# Patient Record
(demographics unavailable — no encounter records)

---

## 2024-10-29 NOTE — REASON FOR VISIT
[de-identified] : Lumbar decompression and fusion L3-S1  [de-identified] : 08/07/24 [de-identified] : 12 [de-identified] : Pt is here for his 12 week post op visit.

## 2024-10-29 NOTE — PHYSICAL EXAM
[General Appearance - Alert] : alert [General Appearance - In No Acute Distress] : in no acute distress [General Appearance - Well Nourished] : well nourished [General Appearance - Well Developed] : well developed [General Appearance - Well-Appearing] : healthy appearing [] : normal voice and communication [Clean] : clean [Dry] : dry [Healing Well] : healing well [No Drainage] : without drainage [FreeTextEntry1] : Lumbar spine [FreeTextEntry6] : No evidence of wound dehiscence. [Oriented To Time, Place, And Person] : oriented to person, place, and time [Impaired Insight] : insight and judgment were intact [Affect] : the affect was normal [Mood] : the mood was normal [Memory Recent] : recent memory was not impaired [Memory Remote] : remote memory was not impaired [Person] : oriented to person [Place] : oriented to place [Time] : oriented to time [Motor Tone] : muscle tone was normal in all four extremities [Involuntary Movements] : no involuntary movements were seen [No Muscle Atrophy] : normal bulk in all four extremities [FreeTextEntry8] : Patient ambulates with the aid of a cane

## 2024-10-29 NOTE — HISTORY OF PRESENT ILLNESS
[FreeTextEntry1] : 49-year-old male presents to the neurosurgery office for postoperative evaluation.  The patient underwent elective L3-S1 laminectomy and fusion to the existing L5-S1 fusion on 8/7/2024.  The patient was discharged to rehab where he stayed for 5 days and is currently home.  He is ambulating with the aid of a cane.  The patient is currently following up with his pain management doctor and has resumed his monthly injectable medication.  He is no longer taking the postoperative Percocet and states that he only takes the muscle relaxer as needed.  He has no new complaints.

## 2024-10-29 NOTE — ASSESSMENT
[FreeTextEntry1] : Discussed the history, physical examination findings, and recent imaging with the patient with all questions answered.  The patient is doing well at the near 3-month postoperative visit.  He may continue to ambulate as tolerated.  He may continue with his pain management doctor as scheduled.  He will follow-up in the neurosurgery office in 3 months.  Radiographs of the lumbar spine to be obtained at the next office visit.

## 2024-10-29 NOTE — DATA REVIEWED
[de-identified] : Were reviewed of the lumbar spine (10/29/2024) intact hardware with good anatomic alignment status post L3-S1 extension lumbar fusion; Were reviewed of the lumbar spine (9/17/2024) intact hardware with good anatomic alignment status post L3-S1 extension lumbar fusion

## 2025-01-27 NOTE — ASSESSMENT
[FreeTextEntry1] : Discussed the history, physical examination findings, and recent imaging with the patient with all questions answered.  The patient is doing well at 5-month postoperative visit.  He may continue to ambulate as tolerated.  He may continue with his pain management doctor as scheduled.  A referral has been provided to see our pain management doctor (Dr. Tam Garcia) for consideration of trigger point injections.  The patient will follow in 6 months for his 1 year post op appointment.  Radiographs of the lumbar spine to be obtained at the next office visit.  The patient reports that he will follow-up with Dr. Stauffer regarding his upper extremity radicular symptoms noted in his right hand.  Discussed that an updated MRI of the cervical spine could be ordered as needed.

## 2025-01-27 NOTE — HISTORY OF PRESENT ILLNESS
[FreeTextEntry1] : 49-year-old male presents to the neurosurgery office for postoperative evaluation.  The patient underwent elective L3-S1 laminectomy and fusion to the existing L5-S1 fusion on 8/7/2024.  The patient was discharged to rehab where he stayed for 5 days and is currently home.  He is ambulating with the aid of a cane.  The patient is currently following up with his pain management doctor and has resumed his monthly injectable medication. The patient complains of some stiffness, however he is doing well.  He is currently going to physical therapy 2-3 times a week and has recently requested an updated referral.

## 2025-01-27 NOTE — DATA REVIEWED
[de-identified] : Were reviewed of the lumbar spine (1/27/2025) intact hardware with good anatomic alignment status post L3-S1 extension lumbar fusion; Were reviewed of the lumbar spine (9/17/2024) intact hardware with good anatomic alignment status post L3-S1 extension lumbar fusion

## 2025-05-12 NOTE — PHYSICAL EXAM
[General Appearance - Alert] : alert [General Appearance - In No Acute Distress] : in no acute distress [General Appearance - Well Nourished] : well nourished [General Appearance - Well Developed] : well developed [General Appearance - Well-Appearing] : healthy appearing [] : normal voice and communication [Oriented To Time, Place, And Person] : oriented to person, place, and time [Impaired Insight] : insight and judgment were intact [Affect] : the affect was normal [Mood] : the mood was normal [Memory Recent] : recent memory was not impaired [Memory Remote] : remote memory was not impaired [Person] : oriented to person [Place] : oriented to place [Time] : oriented to time [Motor Tone] : muscle tone was normal in all four extremities [Involuntary Movements] : no involuntary movements were seen [No Muscle Atrophy] : normal bulk in all four extremities [FreeTextEntry8] : Patient ambulating with the aid of a cane

## 2025-05-12 NOTE — HISTORY OF PRESENT ILLNESS
[FreeTextEntry1] : 50-year-old male presents to the neurosurgery office for evaluation of back pain.  The patient reports an incident where he had acute left-sided back pain warranting further evaluation.  The patient went to [facility] and a CT scan of the lumbar spine was obtained and reviewed.  After review of the imaging, the patient was encouraged to follow-up with his neurosurgery office.  He currently sees the pain management office of Dr. Rey who has switched him to a new nonopioid medication and is currently in discussion of possible spinal cord stimulator trial.  He currently ambulates with the aid of a cane.  He has no new lower extremity radicular complaints at this time.

## 2025-05-12 NOTE — ASSESSMENT
[FreeTextEntry1] : Discussed the history, physical examination findings, and recent CT imaging with the patient with all questions answered.  Reassurance provided.  Discussed that there is no role for acute neurosurgical intervention.  He may continue to follow with his pain management doctor for pain management modalities.  He will follow-up with us as needed.

## 2025-05-12 NOTE — REASON FOR VISIT
[Follow-Up: _____] : a [unfilled] follow-up visit [FreeTextEntry1] : Pt is here for his follow up visit. Continuing lower back pain.

## 2025-05-12 NOTE — DATA REVIEWED
[de-identified] : Was reviewed of the lumbar spine (3/27/2025) evidence of L3-5 laminectomy with L2-S1 fusion.  With a chronic compression fracture deformity at L3 (transitional vertebrae at the lumbosacral junction)

## 2025-06-24 NOTE — RESULTS/DATA
[FreeTextEntry1] : Patient Name: Roderick Puri Patient Phone Number: [phone] Patient ID: [id number] : 1975 Date of Exam: 2025  IMPRESSION:   Abnormal marrow signal involving the T 9 through T12 posterior vertebral bodies with involvement of the bilateral pedicles at T9 through T12 and T10 and T11 spinous processes. The possibility of atypical appearing hemangiomata is considered. Neoplasm is not excluded. Follow-up thoracic spine CT is recommended for further evaluation.  Multiple small disc protrusions, most severe at T10 11 where in conjunction with bilateral facet arthropathy, there is mild compression of the spinal cord.  Subtle elevated signal intensity within the spinal cord at T10-11 consistent with edema and/or myelomalacia from chronic compression of the spinal cord.

## 2025-06-24 NOTE — ASSESSMENT
[FreeTextEntry1] : Diagnosis: Chronic pain after lumbar fusion surgery.  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  This is a gentleman with focal stenosis at the T10 level approximately with stenosis notable.  He is got some atypical features of the bone consistent with hemangiomata at these levels but no evidence of neoplasm particularly.  CT scan can be considered if this needs to be looked at further.  From my point of view the spinal cord stimulator is a reasonable treatment option. I think that if he has a laminectomy at the T11 level may be even up to T10 and the paddle placed at that level above then it would serve a twofold purpose of exposure for the stimulator, as well as decompression of the spinal cord.  Spinal cord stimulator is not procedure I do an of assured him he is in good hands with Dr. Rodarte at [facility].

## 2025-06-24 NOTE — REASON FOR VISIT
[Follow-Up: _____] : a [unfilled] follow-up visit [FreeTextEntry1] : Christiano is here for clinical reevaluation and imaging review of his thoracic spine MRI.  I have personally him having operated on the cervical region in his lumbar region which he had done pretty well with.  However at this point he has been having some chronic pain and is been seeing pain management team.  He had a stimulator trial which apparently went very well for him and now he is considering spinal cord stimulator implantation with my colleague Dr. Jaramillo.  He had an MRI of the thoracic spine for review as part of the workup and I am reviewing it with him today.  He states some symptoms that may be reminiscent of thoracic myelopathy or compression.  However it is difficult as he is also got underlying  radicular symptoms as well.    PM Krissy PCP [facility]

## 2025-06-24 NOTE — PHYSICAL EXAM
[Antalgic] : antalgic [Able to toe walk] : the patient was not able to toe walk [Able to heel walk] : the patient was not able to heel walk

## 2025-06-25 NOTE — HISTORY OF PRESENT ILLNESS
[de-identified] : The patient reports a long history of low back pain. Pain radiates down both legs with weakness, tingling and numbness. He also has persistent right foot drop and difficulty with balance. He also has urinary urgency.   He underwent Right L4-5 microdiscectomy and foraminotomy on8/10/20, L5-S1 lumbar decompression with fusion on 9/9/21 and L3-S1 laminectomy and fusion on 8/7/24 with Dr. Vishal Bullock.  He was recently seen [facility] and CT lumbar spine was done. 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 Lumbarization of S1 with small disc space at S1-S2. Chronic compression deformity L3. Prior L3-L5 laminectomies with transpedicular screws and fusion L2-S1. Lucency surrounding the screws at S1. MRI thoracic spine done on 6/16/25 showed central canal stenosis with myelomalacia at T10-11.  He had spinal cord stimulator trial with Dr. Rey ([employer] system). He reports 60% improve in pain in his low back and legs. He was able to take a walk, do laundry. He would like to move forward with SCS placement

## 2025-06-25 NOTE — ASSESSMENT
[FreeTextEntry1] : 50 years old man with a long history of chronic severe back pain . He has had failed conservative pain management including physical therapy, injections, pain medications and surgeries. Pain has become constant and is interfering with his quality of life.  He had spinal cord stimulator trial with Dr. Rey ([employer] system). He reports 60% improve in pain in his low back and legs. He was able to take a walk, do laundry. He would like to move forward with SCS placement  Plan: - T11 laminectomy for spinal cord stimulator placement - Pt will have a visit with Dr. Bullock and discuss the above plan and if he is in agreement - Surgical intervention discussed in great details with benefits and risks including but not limited to infection, CSF leak, failure to relieve symptoms among others. Risks and benefits as well as alternatives to the proposed treatment were also provided to the patient.  He was given the opportunity to have all their questions answered to their satisfaction. The patient would like to proceed as planned.

## 2025-06-25 NOTE — ASSESSMENT
[FreeTextEntry1] : 50 years old man with a long history of chronic severe back pain . He has had failed conservative pain management including physical therapy, injections, pain medications and surgeries. Pain has become constant and is interfering with his quality of life.  He had spinal cord stimulator trial with Dr. Rey ([employer] system). He reports 60% improve in pain in his low back and legs. He was able to take a walk, do laundry. He would like to move forward with SCS placement  Plan: - T11 laminectomy for spinal cord stimulator placement - Pt will have a visit with Dr. Bullock and discuss the above plan and if he is in agreement - Surgical intervention discussed in great details with benefits and risks including but not limited to infection, CSF leak, failure to relieve symptoms among others. Risks and benefits as well as alternatives to the proposed treatment were also provided to the patient.  He was given the opportunity to have all their questions answered to their satisfaction. The patient would like to proceed as planned.

## 2025-06-25 NOTE — PHYSICAL EXAM
[General Appearance - Alert] : alert [General Appearance - In No Acute Distress] : in no acute distress [General Appearance - Well Nourished] : well nourished [General Appearance - Well Developed] : well developed [Oriented To Time, Place, And Person] : oriented to person, place, and time [Impaired Insight] : insight and judgment were intact [Affect] : the affect was normal [Mood] : the mood was normal [No Muscle Atrophy] : normal bulk in all four extremities [3] : S1 ankle flexors 3/5  [4] : S1 ankle flexors 4/5 [Limited Balance] : the patient's balance was impaired [2+] : Brachioradialis left 2+ [3+] : Patella left 3+ [___] : [unfilled] ~Ubeats present on the right [No Visual Abnormalities] : no visible abnormalities [Sclera] : the sclera and conjunctiva were normal [Outer Ear] : the ears and nose were normal in appearance [Hearing Threshold Finger Rub Not [place]] : hearing was normal [Neck Appearance] : the appearance of the neck was normal [Neck Cervical Mass (___cm)] : no neck mass was observed [] : no respiratory distress [Exaggerated Use Of Accessory Muscles For Inspiration] : no accessory muscle use [Heart Rate And Rhythm] : heart rate was normal and rhythm regular [Edema] : there was no peripheral edema [Abdomen Soft] : soft [Abdomen Tenderness] : non-tender [No Spinal Tenderness] : no spinal tenderness [Nail Clubbing] : no clubbing  or cyanosis of the fingernails [Involuntary Movements] : no involuntary movements were seen [Musculoskeletal - Swelling] : no joint swelling seen [FreeTextEntry1] : ambulating with a cane [Tremor] : no tremor present [___] : absent on the left

## 2025-06-25 NOTE — ASSESSMENT
[FreeTextEntry1] : 50 years old man with a long history of chronic severe back pain . He has had failed conservative pain management including physical therapy, injections, pain medications and surgeries. Pain has become constant and is interfering with his quality of life.  He had spinal cord stimulator trial with Dr. Rey ([employer] system). He reports 60% improve in pain in his low back and legs. He was able to take a walk, do laundry. He would like to move forward with SCS placement  Plan: - T11 laminectomy for spinal cord stimulator placement - Pt will have a visit with Dr. Bullock and discuss the above plan and if he is in agreement - Surgical intervention discussed in great details with benefits and risks including but not limited to infection, CSF leak, failure to relieve symptoms among others. Risks and benefits as well as alternatives to the proposed treatment were also provided to the patient.  He was given the opportunity to have all their questions answered to their satisfaction. The patient would like to proceed as planned.

## 2025-06-25 NOTE — PHYSICAL EXAM
[General Appearance - Alert] : alert [General Appearance - In No Acute Distress] : in no acute distress [General Appearance - Well Nourished] : well nourished [General Appearance - Well Developed] : well developed [Oriented To Time, Place, And Person] : oriented to person, place, and time [Impaired Insight] : insight and judgment were intact [Affect] : the affect was normal [Mood] : the mood was normal [No Muscle Atrophy] : normal bulk in all four extremities [3] : S1 ankle flexors 3/5  [4] : S1 ankle flexors 4/5 [Limited Balance] : the patient's balance was impaired [2+] : Brachioradialis left 2+ [3+] : Patella left 3+ [___] : [unfilled] ~Ubeats present on the right [No Visual Abnormalities] : no visible abnormalities [Sclera] : the sclera and conjunctiva were normal [Outer Ear] : the ears and nose were normal in appearance [Hearing Threshold Finger Rub Not [place]] : hearing was normal [Neck Appearance] : the appearance of the neck was normal [Neck Cervical Mass (___cm)] : no neck mass was observed [] : no respiratory distress [Exaggerated Use Of Accessory Muscles For Inspiration] : no accessory muscle use [Heart Rate And Rhythm] : heart rate was normal and rhythm regular [Edema] : there was no peripheral edema [Abdomen Soft] : soft [Abdomen Tenderness] : non-tender [No Spinal Tenderness] : no spinal tenderness [Nail Clubbing] : no clubbing  or cyanosis of the fingernails [Involuntary Movements] : no involuntary movements were seen [Musculoskeletal - Swelling] : no joint swelling seen [FreeTextEntry1] : ambulating with a cane [Tremor] : no tremor present [___] : absent on the left

## 2025-06-25 NOTE — HISTORY OF PRESENT ILLNESS
[de-identified] : The patient reports a long history of low back pain. Pain radiates down both legs with weakness, tingling and numbness. He also has persistent right foot drop and difficulty with balance. He also has urinary urgency.   He underwent Right L4-5 microdiscectomy and foraminotomy on8/10/20, L5-S1 lumbar decompression with fusion on 9/9/21 and L3-S1 laminectomy and fusion on 8/7/24 with Dr. Vishal Bullock.  He was recently seen [facility] and CT lumbar spine was done. 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 Lumbarization of S1 with small disc space at S1-S2. Chronic compression deformity L3. Prior L3-L5 laminectomies with transpedicular screws and fusion L2-S1. Lucency surrounding the screws at S1. MRI thoracic spine done on 6/16/25 showed central canal stenosis with myelomalacia at T10-11.  He had spinal cord stimulator trial with Dr. Rey ([employer] system). He reports 60% improve in pain in his low back and legs. He was able to take a walk, do laundry. He would like to move forward with SCS placement

## 2025-06-25 NOTE — HISTORY OF PRESENT ILLNESS
[de-identified] : The patient reports a long history of low back pain. Pain radiates down both legs with weakness, tingling and numbness. He also has persistent right foot drop and difficulty with balance. He also has urinary urgency.   He underwent Right L4-5 microdiscectomy and foraminotomy on8/10/20, L5-S1 lumbar decompression with fusion on 9/9/21 and L3-S1 laminectomy and fusion on 8/7/24 with Dr. Vishal Bullock.  He was recently seen [facility] and CT lumbar spine was done. 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 Lumbarization of S1 with small disc space at S1-S2. Chronic compression deformity L3. Prior L3-L5 laminectomies with transpedicular screws and fusion L2-S1. Lucency surrounding the screws at S1. MRI thoracic spine done on 6/16/25 showed central canal stenosis with myelomalacia at T10-11.  He had spinal cord stimulator trial with Dr. Rey ([employer] system). He reports 60% improve in pain in his low back and legs. He was able to take a walk, do laundry. He would like to move forward with SCS placement

## 2025-06-25 NOTE — PHYSICAL EXAM
[General Appearance - Alert] : alert [General Appearance - In No Acute Distress] : in no acute distress [General Appearance - Well Nourished] : well nourished [General Appearance - Well Developed] : well developed [Oriented To Time, Place, And Person] : oriented to person, place, and time [Impaired Insight] : insight and judgment were intact [Affect] : the affect was normal [Mood] : the mood was normal [No Muscle Atrophy] : normal bulk in all four extremities [3] : S1 ankle flexors 3/5  [4] : S1 ankle flexors 4/5 [Limited Balance] : the patient's balance was impaired [2+] : Brachioradialis left 2+ [3+] : Patella left 3+ [___] : [unfilled] ~Ubeats present on the right [No Visual Abnormalities] : no visible abnormalities [Outer Ear] : the ears and nose were normal in appearance [Sclera] : the sclera and conjunctiva were normal [Hearing Threshold Finger Rub Not [place]] : hearing was normal [Neck Appearance] : the appearance of the neck was normal [Neck Cervical Mass (___cm)] : no neck mass was observed [] : no respiratory distress [Heart Rate And Rhythm] : heart rate was normal and rhythm regular [Exaggerated Use Of Accessory Muscles For Inspiration] : no accessory muscle use [Edema] : there was no peripheral edema [Abdomen Soft] : soft [Abdomen Tenderness] : non-tender [No Spinal Tenderness] : no spinal tenderness [Nail Clubbing] : no clubbing  or cyanosis of the fingernails [Involuntary Movements] : no involuntary movements were seen [Musculoskeletal - Swelling] : no joint swelling seen [FreeTextEntry1] : ambulating with a cane [Tremor] : no tremor present [___] : absent on the left